# Patient Record
Sex: MALE | Race: WHITE | NOT HISPANIC OR LATINO | Employment: FULL TIME | URBAN - NONMETROPOLITAN AREA
[De-identification: names, ages, dates, MRNs, and addresses within clinical notes are randomized per-mention and may not be internally consistent; named-entity substitution may affect disease eponyms.]

---

## 2018-06-26 ENCOUNTER — TRANSCRIBE ORDERS (OUTPATIENT)
Dept: ADMINISTRATIVE | Facility: HOSPITAL | Age: 33
End: 2018-06-26

## 2018-06-26 DIAGNOSIS — R06.02 SOB (SHORTNESS OF BREATH): Primary | ICD-10-CM

## 2018-07-23 ENCOUNTER — HOSPITAL ENCOUNTER (OUTPATIENT)
Dept: RESPIRATORY THERAPY | Facility: HOSPITAL | Age: 33
Discharge: HOME | End: 2018-07-23
Admitting: PHYSICIAN ASSISTANT

## 2018-07-23 DIAGNOSIS — R06.02 SOB (SHORTNESS OF BREATH): ICD-10-CM

## 2018-07-23 PROCEDURE — 94729 DIFFUSING CAPACITY: CPT | Performed by: INTERNAL MEDICINE

## 2018-07-23 PROCEDURE — 94727 GAS DIL/WSHOT DETER LNG VOL: CPT | Performed by: INTERNAL MEDICINE

## 2018-07-23 PROCEDURE — 94727 GAS DIL/WSHOT DETER LNG VOL: CPT

## 2018-07-23 PROCEDURE — 94060 EVALUATION OF WHEEZING: CPT | Performed by: INTERNAL MEDICINE

## 2018-07-23 PROCEDURE — 94729 DIFFUSING CAPACITY: CPT

## 2018-07-23 PROCEDURE — 94060 EVALUATION OF WHEEZING: CPT

## 2018-07-23 PROCEDURE — 94640 AIRWAY INHALATION TREATMENT: CPT

## 2018-07-23 RX ORDER — ALBUTEROL SULFATE 2.5 MG/3ML
2.5 SOLUTION RESPIRATORY (INHALATION) ONCE
Status: COMPLETED | OUTPATIENT
Start: 2018-07-23 | End: 2018-07-23

## 2018-07-23 RX ADMIN — ALBUTEROL SULFATE 2.5 MG: 2.5 SOLUTION RESPIRATORY (INHALATION) at 09:40

## 2020-09-10 ENCOUNTER — HOSPITAL ENCOUNTER (OUTPATIENT)
Dept: OTHER | Facility: HOSPITAL | Age: 35
Discharge: HOME | End: 2020-09-10
Attending: NURSE PRACTITIONER

## 2020-09-10 ENCOUNTER — OFFICE VISIT CONVERTED (OUTPATIENT)
Dept: FAMILY MEDICINE CLINIC | Age: 35
End: 2020-09-10
Attending: NURSE PRACTITIONER

## 2020-09-10 LAB
ALBUMIN SERPL-MCNC: 4.3 G/DL (ref 3.5–5)
ALBUMIN/GLOB SERPL: 2 {RATIO} (ref 1.4–2.6)
ALP SERPL-CCNC: 68 U/L (ref 53–128)
ALT SERPL-CCNC: 14 U/L (ref 10–40)
ANION GAP SERPL CALC-SCNC: 15 MMOL/L (ref 8–19)
AST SERPL-CCNC: 24 U/L (ref 15–50)
BASOPHILS # BLD MANUAL: 0.02 10*3/UL (ref 0–0.2)
BASOPHILS NFR BLD MANUAL: 0.3 % (ref 0–3)
BILIRUB SERPL-MCNC: 0.56 MG/DL (ref 0.2–1.3)
BUN SERPL-MCNC: 10 MG/DL (ref 5–25)
BUN/CREAT SERPL: 11 {RATIO} (ref 6–20)
CALCIUM SERPL-MCNC: 9 MG/DL (ref 8.7–10.4)
CHLORIDE SERPL-SCNC: 103 MMOL/L (ref 99–111)
CHOLEST SERPL-MCNC: 154 MG/DL (ref 107–200)
CHOLEST/HDLC SERPL: 2.6 {RATIO} (ref 3–6)
CONV CO2: 25 MMOL/L (ref 22–32)
CONV TOTAL PROTEIN: 6.4 G/DL (ref 6.3–8.2)
CREAT UR-MCNC: 0.88 MG/DL (ref 0.7–1.2)
DEPRECATED RDW RBC AUTO: 39.1 FL
EOSINOPHIL # BLD MANUAL: 0.09 10*3/UL (ref 0–0.7)
EOSINOPHIL NFR BLD MANUAL: 1.3 % (ref 0–7)
ERYTHROCYTE [DISTWIDTH] IN BLOOD BY AUTOMATED COUNT: 11.8 % (ref 11.5–14.5)
GFR SERPLBLD BASED ON 1.73 SQ M-ARVRAT: >60 ML/MIN/{1.73_M2}
GLOBULIN UR ELPH-MCNC: 2.1 G/DL (ref 2–3.5)
GLUCOSE SERPL-MCNC: 88 MG/DL (ref 70–99)
GRANS (ABSOLUTE): 5.25 10*3/UL (ref 2–8)
GRANS: 74.5 % (ref 30–85)
HBA1C MFR BLD: 15.9 G/DL (ref 14–18)
HCT VFR BLD AUTO: 44.9 % (ref 42–52)
HDLC SERPL-MCNC: 60 MG/DL (ref 40–60)
IMM GRANULOCYTES # BLD: 0.01 10*3/UL (ref 0–0.54)
IMM GRANULOCYTES NFR BLD: 0.1 % (ref 0–0.43)
LDLC SERPL CALC-MCNC: 54 MG/DL (ref 70–100)
LYMPHOCYTES # BLD MANUAL: 1.08 10*3/UL (ref 1–5)
LYMPHOCYTES NFR BLD MANUAL: 8.5 % (ref 3–10)
MCH RBC QN AUTO: 31.5 PG (ref 27–31)
MCHC RBC AUTO-ENTMCNC: 35.4 G/DL (ref 33–37)
MCV RBC AUTO: 89.1 FL (ref 80–96)
MONOCYTES # BLD AUTO: 0.6 10*3/UL (ref 0.2–1.2)
OSMOLALITY SERPL CALC.SUM OF ELEC: 286 MOSM/KG (ref 273–304)
PLATELET # BLD AUTO: 220 10*3/UL (ref 130–400)
PMV BLD AUTO: 9.2 FL (ref 7.4–10.4)
POTASSIUM SERPL-SCNC: 4.3 MMOL/L (ref 3.5–5.3)
RBC # BLD AUTO: 5.04 10*6/UL (ref 4.7–6.1)
SODIUM SERPL-SCNC: 139 MMOL/L (ref 135–147)
TRIGL SERPL-MCNC: 200 MG/DL (ref 40–150)
TSH SERPL-ACNC: 0.83 M[IU]/L (ref 0.27–4.2)
VARIANT LYMPHS NFR BLD MANUAL: 15.3 % (ref 20–45)
VIT B12 SERPL-MCNC: 297 PG/ML (ref 211–911)
VLDLC SERPL-MCNC: 40 MG/DL (ref 5–37)
WBC # BLD AUTO: 7.05 10*3/UL (ref 4.8–10.8)

## 2020-09-11 LAB — 25(OH)D3 SERPL-MCNC: 48.1 NG/ML (ref 30–100)

## 2021-03-18 ENCOUNTER — BULK ORDERING (OUTPATIENT)
Dept: CASE MANAGEMENT | Facility: OTHER | Age: 36
End: 2021-03-18

## 2021-03-18 DIAGNOSIS — Z23 IMMUNIZATION DUE: ICD-10-CM

## 2021-04-19 ENCOUNTER — HOSPITAL ENCOUNTER (EMERGENCY)
Dept: HOSPITAL 49 - FER | Age: 36
Discharge: HOME | End: 2021-04-19
Payer: COMMERCIAL

## 2021-04-19 DIAGNOSIS — F17.210: ICD-10-CM

## 2021-04-19 DIAGNOSIS — E86.0: Primary | ICD-10-CM

## 2021-04-19 LAB
ALBUMIN SERPL-MCNC: 4.2 G/DL (ref 3.4–5)
ALKALINE PHOSHATASE: 69 U/L (ref 46–116)
ALT SERPL-CCNC: 101 U/L (ref 16–63)
AMORPH URATE CRY #/AREA URNS HPF: (no result) /[HPF]
AMPHETAMINES: NEGATIVE
AST: 93 U/L (ref 15–37)
BARBITURATES: NEGATIVE
BASOPHIL: 0.5 % (ref 0–2)
BILIRUBIN - TOTAL: 0.7 MG/DL (ref 0.2–1)
BILIRUBIN: NEGATIVE MG/DL
BLOOD: NEGATIVE ERY/UL
BUN SERPL-MCNC: 6 MG/DL (ref 7–18)
BUN/CREAT RATIO (CALC): 9.1 RATIO
CHLORIDE: 101 MMOL/L (ref 98–107)
CLARITY UR: CLEAR
CO2 (BICARBONATE): 27 MMOL/L (ref 21–32)
COLOR: YELLOW
CREATININE: 0.66 MG/DL (ref 0.67–1.17)
ECSTASY (MDMA): NEGATIVE
EOSINOPHIL: 0.5 % (ref 0–5)
GLOBULIN (CALCULATION): 2.6 G/DL
GLUCOSE (U): NORMAL MG/DL
GLUCOSE SERPL-MCNC: 90 MG/DL (ref 74–106)
HCT: 48.4 % (ref 42–52)
HGB BLD-MCNC: 17.3 G/DL (ref 13.2–18)
LEUKOCYTES: NEGATIVE LEU/UL
LYMPHOCYTE: 12 % (ref 15–48)
MARIJUANA (THC): NEGATIVE
MCH RBC QN AUTO: 32.8 PG (ref 25–31)
MCHC RBC AUTO-ENTMCNC: 35.7 G/DL (ref 32–36)
MCV: 91.7 FL (ref 78–100)
METHADONE: NEGATIVE
MONOCYTE: 9.4 % (ref 0–12)
MPV: 9.2 FL (ref 6–9.5)
NEUTROPHIL: 77.4 % (ref 41–80)
NITRITE: NEGATIVE MG/DL
NRBC: 0
OPIATES: NEGATIVE
OXYCODONE: NEGATIVE
PLT: 180 K/UL (ref 150–400)
POTASSIUM: 3.8 MMOL/L (ref 3.5–5.1)
PROTEIN: (no result) MG/DL
RBC MORPHOLOGY: NORMAL
RBC: 5.28 M/UL (ref 4.7–6)
RDW: 12.3 % (ref 11.5–14)
SPECIFIC GRAVITY: 1.02 (ref 1–1.03)
TOTAL PROTEIN: 6.8 G/DL (ref 6.4–8.2)
URINARY RBC: (no result)
URINARY WBC: (no result)
UROBILINOGEN: 1 MG/DL (ref 0.2–1)
WBC: 6.2 K/UL (ref 4–10.5)

## 2021-05-18 NOTE — PROGRESS NOTES
Kenneth Wang  1985     Office/Outpatient Visit    Visit Date: Thu, Sep 10, 2020 11:38 am    Provider: Avelina Rogers N.P. (Assistant: Lianna Yang MA)    Location: [facility]        Electronically signed by Avelina Rogers N.P. on  09/10/2020 03:45:48 PM                             Subjective:        CC: Mr. Wang is a 35 year old White male.  Patient presents today for establishment, states he takes more medications but name/dosage unknown         HPI: 35-year-old male presenting to clinic for establishment of care.  Patient states his previous PCP is in [place].  Patient is somewhat of a poor historian.  Patient states he was diagnosed with severe anxiety.  He was started on citalopram by previous PCP at 10 mg.  He states he has been on 30 mg in the past and it worked well.  He is wishing for an increase in dosage. Patient states that he is very anxious and not able to sleep due to anxiety.  He has tried Benadryl and melatonin to help him sleep.  He states he has trouble staying asleep.  He falls asleep easily.  He is also taking Abilify for his symptoms.  He is taking Mirapex for restless legs and it works well. He is unsure of family history.  He is also unsure of when his last labs were drawn.  Medical, surgical, family and social histories reviewed.  Patient does drink daily.    ROS:     CONSTITUTIONAL:  Positive for fatigue ( mild ).   Negative for chills or fever.      CARDIOVASCULAR:  Negative for chest pain, dizziness, palpitations and pedal edema.      RESPIRATORY:  Negative for recent cough and dyspnea.      GASTROINTESTINAL:  Positive for anorexia ( loss of appetite ).   Negative for abdominal pain, constipation, diarrhea, nausea or vomiting.      GENITOURINARY:  Negative for dysuria and change in urine stream.      MUSCULOSKELETAL:  Negative for arthralgias and myalgias.      INTEGUMENTARY:  Negative for rash.      NEUROLOGICAL:  Negative for headaches,  paresthesias and weakness.      PSYCHIATRIC:  Positive for feelings of stress, anhedonia, difficulty concentrating and sadness.   Negative for crying spells or suicidal thoughts.          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   Uses E-cig         Alcohol: Frequency: Daily When he drinks, the average quantity of alcohol is 2 drinks.   He typically consumes beer.          Substance Abuse History:     Last Reviewed on 9/10/2020 03:37 PM by Avelina Rogers    None         Mental Health History:     Last Reviewed on 9/10/2020 03:37 PM by Avelina Rogers        depression         Immunizations:     None        Allergies:     Last Reviewed on 9/10/2020 03:37 PM by Avelina Rogers    No Known Allergies.        Current Medications:     Last Reviewed on 9/10/2020 03:37 PM by Avelina Rogers    Mirapex 1.5 mg oral tablet [take 1 tablet po qd]    LaMICtal 100 mg oral tablet [take 1 tablet (100 mg) by oral route 3 times per day]    ARIPiprazole 5 mg oral tablet [take 1 tablet (5 mg) by oral route once daily]    citalopram 10 mg oral tablet [take 1 tablet (10 mg) by oral route once daily]        Objective:        Vitals:         Current: 9/10/2020 11:43:19 AM    Ht:  5 ft, 6 in;  Wt: 116.6 lbs;  BMI: 18.8T: 97.5 F (temporal);  BP: 117/73 mm Hg (left arm, sitting);  P: 98 bpm (left arm (BP Cuff), sitting)        Exams:     PHYSICAL EXAM:     GENERAL: Vitals recorded well developed, well nourished, thin;  well groomed;  no  apparent distress;     EYES: extraocular movements intact; conjunctiva and cornea are normal; PERRLA;     NECK: range of motion is normal; thyroid exam reveals not enlarged;     RESPIRATORY: normal respiratory rate and pattern with no distress; normal breath sounds with no rales, rhonchi, wheezes or rubs;     CARDIOVASCULAR: normal rate; rhythm is regular;  no systolic murmur; no edema;     LYMPHATIC: no enlargement of cervical or facial nodes; no supraclavicular nodes;     SKIN:  no rash; no jaundice, good turgor;     MUSCULOSKELETAL: normal gait;     NEUROLOGIC: mental status: alert and oriented x 3;     PSYCHIATRIC: affect/demeanor: anxious, depressed;  psychomotor: avoids eye contact;  normal speech pattern; normal thought and perception;         Assessment:         F43.23   Adjustment disorder with mixed anxiety and depressed mood       G47.00   Insomnia, unspecified       Z13.31   Encounter for screening for depression           ORDERS:         Meds Prescribed:       [Refilled] citalopram 10 mg oral tablet [take 1 tablet (10 mg) by oral route once daily], #30 (thirty) tablets, Refills: 1 (one)       [New Rx] citalopram 20 mg oral tablet [take 1 tablet (20 mg) by oral route once daily], #30 (thirty) tablets, Refills: 1 (one)         Lab Orders:       [id number]  PHYSF - [facility] PHYSICAL: CMP, CBC, TSH, LIPID: [id number], [id number], [id number], [id number]  (Send-Out)            05752  VB12 - [facility] Vitamin B12  (Send-Out)            82506  VITD - [facility] Vitamin D, 25 Hydroxy  (Send-Out)              Other Orders:         Depression screen positive and follow up plan documented  (In-House)                      Plan:         Adjustment disorder with mixed anxiety and depressed moodPatient is to take 20 mg tablet along with 10 mg tablet to equal 30 mg of Celexa daily.  Potential side effects of medication discussed.  Hopefully this will help him eat/increased appetite as it did in the past.  Patient to have labs drawn today.  Will notify  patient of results.  Patient is to notify office with any acute concerns or issues.  Patient verbalizes understanding and has no further questions upon discharge.    LABORATORY:  Labs ordered to be performed today include B12, PHYSICAL PANEL; CMP, CBC, TSH, LIPID, and Vitamin D.            Prescriptions:       [Refilled] citalopram 10 mg oral tablet [take 1 tablet (10 mg) by oral route once daily], #30 (thirty) tablets, Refills: 1 (one)       [New Rx] citalopram 20 mg oral tablet [take 1 tablet (20 mg) by oral route once daily], #30 (thirty) tablets, Refills: 1 (one)           Orders:       [id number]  PHYSF - [facility] PHYSICAL: CMP, CBC, TSH, LIPID: [id number], [id number], [id number], [id number]  (Send-Out)            63440  VB12 - [facility] Vitamin B12  (Send-Out)            13636  VITD - [facility] Vitamin D, 25 Hydroxy  (Send-Out)              Insomnia, unspecifiedHopefully Celexa will also help with anxiety at night and patient will be able to sleep.  He is to try over-the-counter Unisom for insomnia as well.  Will discuss at next visit if medication does not help.  Patient verbalizes understanding, agrees plan of care and has no further questions upon discharge.        Encounter for screening for depression    MIPS PHQ-9 Depression Screening: Completed form scanned and in chart; Total Score 17 Positive Depression Screen: Suicide Risk Assessment completed--denies suicidal/homicidal ideation; Pharmacologic intervention initiated/modified           Orders:         Depression screen positive and follow up plan documented  (In-House)                  Charge Capture:         Primary Diagnosis:     F43.23  Adjustment disorder with mixed anxiety and depressed mood           Orders:      [id number]  Office visit - new pt, level 3  (In-House)              G47.00  Insomnia, unspecified     Z13.31  Encounter for screening for depression           Orders:        Depression screen positive and follow up plan documented  (In-House)

## 2021-07-02 VITALS
HEART RATE: 98 BPM | TEMPERATURE: 97.5 F | DIASTOLIC BLOOD PRESSURE: 73 MMHG | HEIGHT: 66 IN | BODY MASS INDEX: 18.74 KG/M2 | SYSTOLIC BLOOD PRESSURE: 117 MMHG | WEIGHT: 116.6 LBS

## 2022-05-02 ENCOUNTER — HOSPITAL ENCOUNTER (EMERGENCY)
Dept: HOSPITAL 49 - FER | Age: 37
Discharge: HOME | End: 2022-05-02
Payer: COMMERCIAL

## 2022-05-02 DIAGNOSIS — Z28.310: ICD-10-CM

## 2022-05-02 DIAGNOSIS — G25.81: ICD-10-CM

## 2022-05-02 DIAGNOSIS — R07.89: Primary | ICD-10-CM

## 2022-05-02 LAB
ALBUMIN SERPL-MCNC: 3.7 G/DL (ref 3.4–5)
ALKALINE PHOSHATASE: 77 U/L (ref 46–116)
ALT SERPL-CCNC: 17 U/L (ref 16–63)
AST: 17 U/L (ref 15–37)
BASOPHIL: 0.4 % (ref 0–2)
BILIRUBIN - TOTAL: 0.2 MG/DL (ref 0.2–1)
BUN SERPL-MCNC: 9 MG/DL (ref 7–18)
BUN/CREAT RATIO (CALC): 14.5 RATIO
CHLORIDE: 103 MMOL/L (ref 98–107)
CO2 (BICARBONATE): 26 MMOL/L (ref 21–32)
CREATININE: 0.62 MG/DL (ref 0.67–1.17)
EOSINOPHIL: 1.1 % (ref 0–5)
GLOBULIN (CALCULATION): 3.1 G/DL
GLUCOSE SERPL-MCNC: 117 MG/DL (ref 74–106)
HCT: 42.6 % (ref 42–52)
HGB BLD-MCNC: 14.8 G/DL (ref 13.2–18)
LYMPHOCYTE: 14.1 % (ref 15–48)
MAGNESIUM SERPL-MCNC: 2.2 MG/DL (ref 1.8–2.4)
MCH RBC QN AUTO: 30.7 PG (ref 25–31)
MCHC RBC AUTO-ENTMCNC: 34.7 G/DL (ref 32–36)
MCV: 88.4 FL (ref 78–100)
MONOCYTE: 5.7 % (ref 0–12)
MPV: 9.5 FL (ref 6–9.5)
NEUTROPHIL: 78.4 % (ref 41–80)
NRBC: 0
PLT: 284 K/UL (ref 150–400)
POTASSIUM: 4.1 MMOL/L (ref 3.5–5.1)
RBC MORPHOLOGY: NORMAL
RBC: 4.82 M/UL (ref 4.7–6)
RDW: 12 % (ref 11.5–14)
TOTAL PROTEIN: 6.8 G/DL (ref 6.4–8.2)
WBC: 9.1 K/UL (ref 4–10.5)

## 2022-05-04 ENCOUNTER — OFFICE VISIT (OUTPATIENT)
Dept: FAMILY MEDICINE CLINIC | Age: 37
End: 2022-05-04

## 2022-05-04 VITALS
WEIGHT: 113 LBS | DIASTOLIC BLOOD PRESSURE: 79 MMHG | HEIGHT: 66 IN | SYSTOLIC BLOOD PRESSURE: 127 MMHG | BODY MASS INDEX: 18.16 KG/M2 | HEART RATE: 90 BPM

## 2022-05-04 DIAGNOSIS — F17.210 CIGARETTE NICOTINE DEPENDENCE WITHOUT COMPLICATION: ICD-10-CM

## 2022-05-04 DIAGNOSIS — G43.009 MIGRAINE WITHOUT AURA AND WITHOUT STATUS MIGRAINOSUS, NOT INTRACTABLE: ICD-10-CM

## 2022-05-04 DIAGNOSIS — G25.81 RESTLESS LEG SYNDROME: ICD-10-CM

## 2022-05-04 DIAGNOSIS — F41.9 ANXIETY: Primary | ICD-10-CM

## 2022-05-04 PROCEDURE — 99214 OFFICE O/P EST MOD 30 MIN: CPT | Performed by: NURSE PRACTITIONER

## 2022-05-04 RX ORDER — RIZATRIPTAN BENZOATE 10 MG/1
10 TABLET ORAL ONCE AS NEEDED
Qty: 18 TABLET | Refills: 1 | Status: SHIPPED | OUTPATIENT
Start: 2022-05-04 | End: 2023-01-09

## 2022-05-04 RX ORDER — PRAMIPEXOLE DIHYDROCHLORIDE 0.5 MG/1
0.5 TABLET ORAL
Qty: 30 TABLET | Refills: 1 | Status: SHIPPED | OUTPATIENT
Start: 2022-05-04 | End: 2022-06-15 | Stop reason: SDUPTHER

## 2022-05-04 RX ORDER — CITALOPRAM 10 MG/1
10 TABLET ORAL DAILY
Qty: 30 TABLET | Refills: 1 | Status: SHIPPED | OUTPATIENT
Start: 2022-05-04 | End: 2022-06-15 | Stop reason: SDUPTHER

## 2022-05-04 RX ORDER — PRAMIPEXOLE DIHYDROCHLORIDE 0.25 MG/1
0.25 TABLET ORAL
COMMUNITY
Start: 2022-05-02 | End: 2022-05-04 | Stop reason: SDUPTHER

## 2022-05-04 NOTE — ASSESSMENT & PLAN NOTE
Patient has been educated on the risk of diseases from using tobacco products such as cancer, COPD, and heart disease and has been advised to quit. I spent less than 3 minutes counseling the patient.

## 2022-05-04 NOTE — PROGRESS NOTES
Chief Complaint  Kenneth Wang presents to [facility] for Establish Care    Subjective          History of Present Illness    Kenneth reports that he is on mirapex for restless leg syndrome. Was previously on 1 mg nightly. Had been off his medication for about 2 years. Was seen at [facility] earlier this week for not sleeping and restarted on mirapex 0.25 mg nightly. Not helping as much as the 1 mg used to.  Was previously seeing mental health provider for anxiety and depression. Also thought possible diagnosis of bipolar disorder as well. Was taking lamictal. Also previously on prozac which was not beneficial. He notes abilify was beneficial. Also no citalopram in the past. Notes mood changes, irritability, feeling overwhelmed, headaches. Was seeing Dr Serrano. Also seeing therapist. No longer drinking lots of alcohol. Only on rare occasion.   He reports migraine headaches. Nausea. Has tried Advil, Tylenol, Excedrin migraine. He gets these frequently.     Review of Systems       No Known Allergies   Past Medical History:   Diagnosis Date   • Anxiety    • Restless leg syndrome      Current Outpatient Medications   Medication Sig Dispense Refill   • pramipexole (MIRAPEX) 0.5 MG tablet Take 1 tablet by mouth every night at bedtime. 30 tablet 1   • citalopram (CeleXA) 10 MG tablet Take 1 tablet by mouth Daily. 30 tablet 1   • rizatriptan (Maxalt) 10 MG tablet Take 1 tablet by mouth 1 (One) Time As Needed for Migraine. May repeat in 2 hours if needed 18 tablet 1     No current facility-administered medications for this visit.     History reviewed. No pertinent surgical history.   Social History     Tobacco Use   • Smoking status: Current Every Day Smoker     Packs/day: 1.00     Types: Cigarettes     Start date: 2000   • Smokeless tobacco: Never Used   Substance Use Topics   • Alcohol use: Yes     Comment: rarely, 1-2 per month   • Drug use: Never     Family History   Problem Relation Age  "of Onset   • Heart disease Paternal Grandmother      Health Maintenance Due   Topic Date Due   • ANNUAL PHYSICAL  Never done      Immunization History   Administered Date(s) Administered   • DTaP 05/16/2018        Objective     Vitals:    05/04/22 1626   BP: 127/79   BP Location: Right arm   Patient Position: Sitting   Pulse: 90   Weight: 51.3 kg (113 lb)   Height: 167.6 cm (66\")     Body mass index is 18.24 kg/m².     Physical Exam  Vitals reviewed.   Constitutional:       General: He is not in acute distress.     Appearance: Normal appearance. He is well-developed.   HENT:      Head: Normocephalic and atraumatic.   Cardiovascular:      Rate and Rhythm: Normal rate and regular rhythm.   Pulmonary:      Effort: Pulmonary effort is normal.      Breath sounds: Normal breath sounds.   Neurological:      Mental Status: He is alert and oriented to person, place, and time.   Psychiatric:         Mood and Affect: Mood and affect normal.         Behavior: Behavior normal.           Result Review :                               Assessment and Plan      Diagnoses and all orders for this visit:    1. Anxiety (Primary)  Assessment & Plan:  Restart daily citalopram. Counseling encouraged. Resources provided.     Orders:  -     citalopram (CeleXA) 10 MG tablet; Take 1 tablet by mouth Daily.  Dispense: 30 tablet; Refill: 1    2. Restless leg syndrome  Comments:  Will increase mirapex dose to 0.5 mg nightly PRN.   Orders:  -     pramipexole (MIRAPEX) 0.5 MG tablet; Take 1 tablet by mouth every night at bedtime.  Dispense: 30 tablet; Refill: 1    3. Migraine without aura and without status migrainosus, not intractable  Assessment & Plan:    Trial maxalt PRN. Headache diary encouraged      Orders:  -     rizatriptan (Maxalt) 10 MG tablet; Take 1 tablet by mouth 1 (One) Time As Needed for Migraine. May repeat in 2 hours if needed  Dispense: 18 tablet; Refill: 1    4. Cigarette nicotine dependence without complication  Assessment & " Plan:    Patient has been educated on the risk of diseases from using tobacco products such as cancer, COPD, and heart disease and has been advised to quit. I spent less than 3 minutes counseling the patient.                  Follow Up     Return in about 6 weeks (around 6/15/2022) for Recheck.

## 2022-06-15 ENCOUNTER — OFFICE VISIT (OUTPATIENT)
Dept: FAMILY MEDICINE CLINIC | Age: 37
End: 2022-06-15

## 2022-06-15 VITALS
HEIGHT: 66 IN | BODY MASS INDEX: 18 KG/M2 | SYSTOLIC BLOOD PRESSURE: 117 MMHG | HEART RATE: 91 BPM | DIASTOLIC BLOOD PRESSURE: 80 MMHG | WEIGHT: 112 LBS

## 2022-06-15 DIAGNOSIS — F41.9 ANXIETY: ICD-10-CM

## 2022-06-15 DIAGNOSIS — G43.009 MIGRAINE WITHOUT AURA AND WITHOUT STATUS MIGRAINOSUS, NOT INTRACTABLE: ICD-10-CM

## 2022-06-15 DIAGNOSIS — G25.81 RESTLESS LEG SYNDROME: Primary | ICD-10-CM

## 2022-06-15 DIAGNOSIS — F17.210 CIGARETTE NICOTINE DEPENDENCE WITHOUT COMPLICATION: ICD-10-CM

## 2022-06-15 PROCEDURE — 99214 OFFICE O/P EST MOD 30 MIN: CPT | Performed by: NURSE PRACTITIONER

## 2022-06-15 RX ORDER — PRAMIPEXOLE DIHYDROCHLORIDE 0.5 MG/1
TABLET ORAL
Qty: 90 TABLET | Refills: 0 | Status: SHIPPED | OUTPATIENT
Start: 2022-06-15 | End: 2022-08-08

## 2022-06-15 RX ORDER — CITALOPRAM 10 MG/1
10 TABLET ORAL DAILY
Qty: 90 TABLET | Refills: 0 | Status: SHIPPED | OUTPATIENT
Start: 2022-06-15 | End: 2022-08-08

## 2022-06-15 RX ORDER — MIRTAZAPINE 7.5 MG/1
7.5 TABLET, FILM COATED ORAL NIGHTLY
Qty: 90 TABLET | Refills: 0 | Status: SHIPPED | OUTPATIENT
Start: 2022-06-15 | End: 2022-08-08

## 2022-06-15 NOTE — PROGRESS NOTES
Chief Complaint  Kenneth Wang presents to [facility] for Anxiety (6 week follow up)    Subjective          History of Present Illness    Kenneth is following up on anxiety and depression. Current medication includes citalopram which was restarted at visit last month. He was previously seeing therapist and mental health provider. He does not feel that the citalopram is helping as much as it did previously. He also notes concern with keeping weight on especially working out in the heat.   Additionally, started on rizatriptan for migraine headaches. Had previously tried Advil, Tylenol, Excedrin migraine. He notes that he has taken on 3 occassions and has helped when he had to take.   He is also taking mirapex at bedtime for RLS. He reports that it helps most nights but some nights feels like he needs more.     Review of Systems      No Known Allergies   Past Medical History:   Diagnosis Date   • Anxiety    • Restless leg syndrome      Current Outpatient Medications   Medication Sig Dispense Refill   • citalopram (CeleXA) 10 MG tablet Take 1 tablet by mouth Daily. 90 tablet 0   • pramipexole (MIRAPEX) 0.5 MG tablet Take 1-2 tablets nightly PRN RLS. 90 tablet 0   • rizatriptan (Maxalt) 10 MG tablet Take 1 tablet by mouth 1 (One) Time As Needed for Migraine. May repeat in 2 hours if needed 18 tablet 1   • mirtazapine (REMERON) 7.5 MG tablet Take 1 tablet by mouth Every Night. 90 tablet 0     No current facility-administered medications for this visit.     History reviewed. No pertinent surgical history.   Social History     Tobacco Use   • Smoking status: Current Every Day Smoker     Packs/day: 1.00     Types: Cigarettes     Start date: 2000   • Smokeless tobacco: Never Used   Substance Use Topics   • Alcohol use: Yes     Comment: rarely, 1-2 per month   • Drug use: Never     Family History   Problem Relation Age of Onset   • Heart disease Paternal Grandmother      Health Maintenance Due  "  Topic Date Due   • ANNUAL PHYSICAL  Never done      Immunization History   Administered Date(s) Administered   • DTaP 05/16/2018        Objective     Vitals:    06/15/22 1705   BP: 117/80   BP Location: Left arm   Patient Position: Sitting   Pulse: 91   Weight: 50.8 kg (112 lb)   Height: 167.6 cm (65.98\")     Body mass index is 18.09 kg/m².     Physical Exam  Vitals reviewed.   Constitutional:       General: He is not in acute distress.     Appearance: Normal appearance. He is well-developed.   HENT:      Head: Normocephalic and atraumatic.   Cardiovascular:      Rate and Rhythm: Normal rate and regular rhythm.   Pulmonary:      Effort: Pulmonary effort is normal.      Breath sounds: Normal breath sounds.   Neurological:      Mental Status: He is alert and oriented to person, place, and time.   Psychiatric:         Mood and Affect: Mood and affect normal.           Result Review :                               Assessment and Plan {CC Problem List  Visit Diagnosis  ROS  Review (Popup)  Health Maintenance  Quality  BestPractice  Medications  SmartSets  SnapShot Encounters  Media :23}     Diagnoses and all orders for this visit:    1. Restless leg syndrome (Primary)  Comments:  Will change mirapex dose to 0.5 mg to 1 mg nightly PRN.   Orders:  -     pramipexole (MIRAPEX) 0.5 MG tablet; Take 1-2 tablets nightly PRN RLS.  Dispense: 90 tablet; Refill: 0    2. Anxiety  Comments:  Add remeron. Continue citalopram. Will let me know of any concerns.  Orders:  -     citalopram (CeleXA) 10 MG tablet; Take 1 tablet by mouth Daily.  Dispense: 90 tablet; Refill: 0  -     mirtazapine (REMERON) 7.5 MG tablet; Take 1 tablet by mouth Every Night.  Dispense: 90 tablet; Refill: 0    3. Migraine without aura and without status migrainosus, not intractable  Comments:  May continue rizatriptan as needed. No refill needed at this time    4. Cigarette nicotine dependence without complication  Assessment & Plan:  Patient has " been educated on the risk of diseases from using tobacco products such as cancer, COPD, and heart disease and has been advised to quit. I spent less than 3 minutes counseling the patient.                  Follow Up     Return in about 8 weeks (around 8/10/2022) for Recheck.

## 2022-07-25 ENCOUNTER — HOSPITAL ENCOUNTER (OUTPATIENT)
Dept: GENERAL RADIOLOGY | Facility: HOSPITAL | Age: 37
Discharge: HOME | End: 2022-07-25
Admitting: PHYSICIAN ASSISTANT

## 2022-07-25 ENCOUNTER — OFFICE VISIT (OUTPATIENT)
Dept: FAMILY MEDICINE CLINIC | Age: 37
End: 2022-07-25

## 2022-07-25 VITALS
WEIGHT: 108 LBS | HEIGHT: 66 IN | TEMPERATURE: 97.9 F | BODY MASS INDEX: 17.36 KG/M2 | HEART RATE: 102 BPM | DIASTOLIC BLOOD PRESSURE: 77 MMHG | SYSTOLIC BLOOD PRESSURE: 123 MMHG

## 2022-07-25 DIAGNOSIS — J18.9 PNEUMONIA OF BOTH LUNGS DUE TO INFECTIOUS ORGANISM, UNSPECIFIED PART OF LUNG: Primary | ICD-10-CM

## 2022-07-25 DIAGNOSIS — R07.89 CHEST WALL PAIN: ICD-10-CM

## 2022-07-25 PROCEDURE — 71046 X-RAY EXAM CHEST 2 VIEWS: CPT

## 2022-07-25 PROCEDURE — 99213 OFFICE O/P EST LOW 20 MIN: CPT | Performed by: PHYSICIAN ASSISTANT

## 2022-07-25 RX ORDER — MELOXICAM 7.5 MG/1
7.5 TABLET ORAL AS NEEDED
COMMUNITY
Start: 2022-07-22 | End: 2022-07-25 | Stop reason: ALTCHOICE

## 2022-07-25 RX ORDER — DICLOFENAC SODIUM 75 MG/1
75 TABLET, DELAYED RELEASE ORAL 2 TIMES DAILY
Qty: 14 TABLET | Refills: 0 | Status: SHIPPED | OUTPATIENT
Start: 2022-07-25 | End: 2022-08-01

## 2022-07-25 RX ORDER — AMOXICILLIN AND CLAVULANATE POTASSIUM 875; 125 MG/1; MG/1
1 TABLET, FILM COATED ORAL 2 TIMES DAILY
Qty: 14 TABLET | Refills: 0 | Status: SHIPPED | OUTPATIENT
Start: 2022-07-25 | End: 2022-08-01

## 2022-07-25 RX ORDER — AZITHROMYCIN 250 MG/1
TABLET, FILM COATED ORAL
Qty: 6 TABLET | Refills: 0 | Status: SHIPPED | OUTPATIENT
Start: 2022-07-25 | End: 2022-08-08

## 2022-07-25 NOTE — PROGRESS NOTES
"Subjective     CHIEF COMPLAINT    Chief Complaint   Patient presents with   • Inflamed Lungs     Urgent Care follow up            This is a 37-year-old male presenting to the clinic for \"chest wall inflammation.\"  He was seen at an outside clinic 3 days ago for some right sided lateral chest pain.  He states he has had some cough/congestion lately prior to onset of the symptoms.  He reports that they did no testing or x-rays and diagnosed him with the some \"lung inflammation\" but that it was \"outside of his lungs.\"  He has been taking 7.5 mg of meloxicam with minimal improvement.              Review of Systems   Constitutional: Negative for chills, fatigue and fever.   HENT: Positive for congestion. Negative for rhinorrhea and sore throat.    Respiratory: Positive for cough. Negative for shortness of breath and wheezing.    Cardiovascular: Positive for chest pain.   Gastrointestinal: Negative for abdominal pain, diarrhea, nausea and vomiting.   Musculoskeletal: Negative for myalgias.   Skin: Negative for rash.   Neurological: Negative for headaches.            Past Medical History:   Diagnosis Date   • Anxiety    • Restless leg syndrome             History reviewed. No pertinent surgical history.         Family History   Problem Relation Age of Onset   • Heart disease Paternal Grandmother             Social History     Socioeconomic History   • Marital status:    Tobacco Use   • Smoking status: Current Every Day Smoker     Packs/day: 1.00     Types: Cigarettes     Start date: 2000   • Smokeless tobacco: Never Used   Substance and Sexual Activity   • Alcohol use: Yes     Comment: rarely, 1-2 per month   • Drug use: Never   • Sexual activity: Defer            No Known Allergies         Current Outpatient Medications on File Prior to Visit   Medication Sig Dispense Refill   • citalopram (CeleXA) 10 MG tablet Take 1 tablet by mouth Daily. 90 tablet 0   • mirtazapine (REMERON) 7.5 MG tablet Take 1 tablet by mouth " "Every Night. 90 tablet 0   • pramipexole (MIRAPEX) 0.5 MG tablet Take 1-2 tablets nightly PRN RLS. 90 tablet 0   • rizatriptan (Maxalt) 10 MG tablet Take 1 tablet by mouth 1 (One) Time As Needed for Migraine. May repeat in 2 hours if needed 18 tablet 1   • [DISCONTINUED] meloxicam (MOBIC) 7.5 MG tablet Take 7.5 mg by mouth As Needed.       No current facility-administered medications on file prior to visit.            /77 (BP Location: Left arm, Patient Position: Sitting)   Pulse 102   Temp 97.9 °F (36.6 °C) (Oral)   Ht 167.6 cm (65.98\")   Wt 49 kg (108 lb)   BMI 17.44 kg/m²          Objective     Physical Exam  Vitals and nursing note reviewed.   Constitutional:       General: He is not in acute distress.     Appearance: Normal appearance.   HENT:      Head: Normocephalic and atraumatic.   Cardiovascular:      Rate and Rhythm: Normal rate and regular rhythm.      Heart sounds: Normal heart sounds.   Pulmonary:      Effort: Pulmonary effort is normal. No respiratory distress.      Breath sounds: Normal breath sounds. No wheezing, rhonchi or rales.   Chest:      Chest wall: Tenderness (right anterior axillary line, around 4th rib) present.   Skin:     General: Skin is warm and dry.   Neurological:      Mental Status: He is alert and oriented to person, place, and time.   Psychiatric:         Mood and Affect: Mood normal.         Behavior: Behavior normal.              Procedures                    Lab Results (last 24 hours)     ** No results found for the last 24 hours. **                XR Chest PA & Lateral    Result Date: 7/25/2022  PROCEDURE: XR CHEST PA AND LATERAL  COMPARISON: None  INDICATIONS: COUGH, CONGESTION X 2 MONTHS, RIGHT LATERAL RIB/CHEST PAIN X 2 DAYS  FINDINGS:  There is pulmonary hyperinflation suggesting emphysematous change.  There are patchy airspace opacities in the mid lungs bilaterally suggesting pneumonia, right greater than left.  This has a somewhat nodular character on the " right.  Recommend follow-up evaluation to document resolution.  If this persists on follow-up, then chest CT recommended given the patient's reported smoking history.  There is mild degenerative change in the spine.  No pleural fluid is seen.  No pneumothorax is evident.         There are patchy airspace opacities in the mid lungs bilaterally suggesting pneumonia.  This has a somewhat nodular character on the right.  Recommend follow-up evaluation to document resolution.  Please see above.  Pulmonary hyperinflation suggesting emphysema.     GEO PARISH MD       Electronically Signed and Approved By: GEO PARISH MD on 7/25/2022 at 14:48                               Diagnoses and all orders for this visit:    1. Pneumonia of both lungs due to infectious organism, unspecified part of lung (Primary)  -     amoxicillin-clavulanate (AUGMENTIN) 875-125 MG per tablet; Take 1 tablet by mouth 2 (Two) Times a Day for 7 days.  Dispense: 14 tablet; Refill: 0  -     azithromycin (Zithromax Z-Nando) 250 MG tablet; Take 2 tablets by mouth on day 1, then 1 tablet daily on days 2-5  Dispense: 6 tablet; Refill: 0    2. Chest wall pain  -     XR Chest PA & Lateral; Future  -     diclofenac (VOLTAREN) 75 MG EC tablet; Take 1 tablet by mouth 2 (Two) Times a Day for 7 days.  Dispense: 14 tablet; Refill: 0                           FOR FULL DISCHARGE INSTRUCTIONS/COMMENTS/HANDOUTS please see the AVS

## 2022-08-08 ENCOUNTER — OFFICE VISIT (OUTPATIENT)
Dept: FAMILY MEDICINE CLINIC | Age: 37
End: 2022-08-08

## 2022-08-08 VITALS
HEART RATE: 82 BPM | HEIGHT: 66 IN | DIASTOLIC BLOOD PRESSURE: 65 MMHG | WEIGHT: 109 LBS | BODY MASS INDEX: 17.52 KG/M2 | SYSTOLIC BLOOD PRESSURE: 107 MMHG | TEMPERATURE: 98.6 F

## 2022-08-08 DIAGNOSIS — J18.9 PNEUMONIA OF BOTH LUNGS DUE TO INFECTIOUS ORGANISM, UNSPECIFIED PART OF LUNG: ICD-10-CM

## 2022-08-08 DIAGNOSIS — F41.9 ANXIETY: Primary | ICD-10-CM

## 2022-08-08 DIAGNOSIS — F17.210 CIGARETTE NICOTINE DEPENDENCE WITHOUT COMPLICATION: ICD-10-CM

## 2022-08-08 DIAGNOSIS — R09.1 PLEURISY: ICD-10-CM

## 2022-08-08 DIAGNOSIS — G43.009 MIGRAINE WITHOUT AURA AND WITHOUT STATUS MIGRAINOSUS, NOT INTRACTABLE: ICD-10-CM

## 2022-08-08 DIAGNOSIS — G25.81 RESTLESS LEG SYNDROME: ICD-10-CM

## 2022-08-08 DIAGNOSIS — J43.9 PULMONARY EMPHYSEMA, UNSPECIFIED EMPHYSEMA TYPE: ICD-10-CM

## 2022-08-08 PROCEDURE — 99214 OFFICE O/P EST MOD 30 MIN: CPT | Performed by: NURSE PRACTITIONER

## 2022-08-08 RX ORDER — CITALOPRAM 20 MG/1
20 TABLET ORAL DAILY
Qty: 90 TABLET | Refills: 0 | Status: SHIPPED | OUTPATIENT
Start: 2022-08-08 | End: 2022-09-02

## 2022-08-08 RX ORDER — PREDNISONE 10 MG/1
TABLET ORAL
Qty: 35 TABLET | Refills: 0 | Status: SHIPPED | OUTPATIENT
Start: 2022-08-08 | End: 2022-08-23

## 2022-08-08 RX ORDER — PRAMIPEXOLE DIHYDROCHLORIDE 1 MG/1
TABLET ORAL
Qty: 90 TABLET | Refills: 0 | Status: SHIPPED | OUTPATIENT
Start: 2022-08-08 | End: 2022-10-20

## 2022-08-08 RX ORDER — ALBUTEROL SULFATE 90 UG/1
2 AEROSOL, METERED RESPIRATORY (INHALATION) EVERY 4 HOURS PRN
Qty: 18 G | Refills: 2 | Status: SHIPPED | OUTPATIENT
Start: 2022-08-08

## 2022-08-08 NOTE — PROGRESS NOTES
Chief Complaint  Kenneth Wang presents to [facility] for Anxiety (8 week follow up/Lungs still hurting)    Subjective          History of Present Illness    Kenneth is following up on anxiety and depression. Current medication includes citalopram and remeron which was started at last visit.  He feels that the remeron has made him more depressed. His fiance has noticed a difference. He has no desire to do anything. He did not have any negative effects from the citalopram.  Additionally, he is on rizatriptan for migraine headaches. Had previously tried Advil, Tylenol, Excedrin migraine.   He is also taking mirapex at bedtime for RLS. Notes improvement on current dose. He is taking 2 of the 0.5 mg tablets nightly.  Treated for PNA with Augmentin and Zithromax on 7/25/22. He notes symptoms started with cough and congestion. He is havibng more chest discomfort now. He is coughing up some phlegm which is clear. He notes chest discomfort on the right side.  Chest x-ray on 7/25/2022 showed patchy airspace opacities in the mid lungs bilaterally suggesting pneumonia.  This has a somewhat nodular character on the right.  Follow-up evaluation to document resolution was recommended.  Additionally, showed pulmonary hyperinflation suggesting emphysema.      Review of Systems      No Known Allergies   Past Medical History:   Diagnosis Date   • Anxiety    • Restless leg syndrome      Current Outpatient Medications   Medication Sig Dispense Refill   • citalopram (CeleXA) 20 MG tablet Take 1 tablet by mouth Daily. 90 tablet 0   • pramipexole (MIRAPEX) 1 MG tablet Take 1 tablet nightly PRN RLS. 90 tablet 0   • rizatriptan (Maxalt) 10 MG tablet Take 1 tablet by mouth 1 (One) Time As Needed for Migraine. May repeat in 2 hours if needed 18 tablet 1   • albuterol sulfate  (90 Base) MCG/ACT inhaler Inhale 2 puffs Every 4 (Four) Hours As Needed for Shortness of Air. 18 g 2   • predniSONE (DELTASONE) 10  "MG tablet Take 2 tablets by mouth 2 (Two) Times a Day for 5 days, THEN 1 tablet 2 (Two) Times a Day for 5 days, THEN 1 tablet Daily for 5 days. 35 tablet 0     No current facility-administered medications for this visit.     History reviewed. No pertinent surgical history.   Social History     Tobacco Use   • Smoking status: Current Every Day Smoker     Packs/day: 1.00     Types: Cigarettes     Start date: 2000   • Smokeless tobacco: Never Used   Substance Use Topics   • Alcohol use: Yes     Comment: rarely, 1-2 per month   • Drug use: Never     Family History   Problem Relation Age of Onset   • Heart disease Paternal Grandmother      Health Maintenance Due   Topic Date Due   • ANNUAL PHYSICAL  Never done      Immunization History   Administered Date(s) Administered   • DTaP 05/16/2018        Objective     Vitals:    08/08/22 1539   BP: 107/65   BP Location: Left arm   Patient Position: Sitting   Pulse: 82   Temp: 98.6 °F (37 °C)   TempSrc: Oral   Weight: 49.4 kg (109 lb)   Height: 167.6 cm (65.98\")     Body mass index is 17.6 kg/m².     Physical Exam  Vitals reviewed.   Constitutional:       General: He is not in acute distress.     Appearance: Normal appearance. He is well-developed.   HENT:      Head: Normocephalic and atraumatic.   Cardiovascular:      Rate and Rhythm: Normal rate and regular rhythm.   Pulmonary:      Effort: Pulmonary effort is normal.      Breath sounds: Normal breath sounds.   Chest:      Chest wall: Tenderness (right side) present.   Neurological:      Mental Status: He is alert and oriented to person, place, and time.   Psychiatric:         Mood and Affect: Mood and affect normal.           Result Review :     The following data was reviewed by: MICHAEL Matta on 08/08/2022:               XR Chest PA & Lateral (07/25/2022 14:18)             Assessment and Plan      Diagnoses and all orders for this visit:    1. Anxiety (Primary)  Comments:  Stop remeron. Will increase citalopram dose " to 20 mg daily.   Orders:  -     citalopram (CeleXA) 20 MG tablet; Take 1 tablet by mouth Daily.  Dispense: 90 tablet; Refill: 0    2. Migraine without aura and without status migrainosus, not intractable  Comments:  Medical condition is stable.  Continue same therapy.  Will recheck at next regular appointment.    3. Restless leg syndrome  Comments:  Stable on current medication.  Orders:  -     pramipexole (MIRAPEX) 1 MG tablet; Take 1 tablet nightly PRN RLS.  Dispense: 90 tablet; Refill: 0    4. Cigarette nicotine dependence without complication  Assessment & Plan:  Patient has been educated on the risk of diseases from using tobacco products such as cancer, COPD, and heart disease and has been advised to quit. I spent less than 3 minutes counseling the patient.          5. Pleurisy  -     predniSONE (DELTASONE) 10 MG tablet; Take 2 tablets by mouth 2 (Two) Times a Day for 5 days, THEN 1 tablet 2 (Two) Times a Day for 5 days, THEN 1 tablet Daily for 5 days.  Dispense: 35 tablet; Refill: 0    6. Pulmonary emphysema, unspecified emphysema type (HCC)  -     albuterol sulfate  (90 Base) MCG/ACT inhaler; Inhale 2 puffs Every 4 (Four) Hours As Needed for Shortness of Air.  Dispense: 18 g; Refill: 2    7. Pneumonia of both lungs due to infectious organism, unspecified part of lung  Comments:  Will repeat chest xray one month after previous.   Orders:  -     XR Chest PA & Lateral; Future            Follow Up     Return in about 3 months (around 11/8/2022) for Recheck.

## 2022-08-31 ENCOUNTER — OFFICE VISIT (OUTPATIENT)
Dept: FAMILY MEDICINE CLINIC | Age: 37
End: 2022-08-31

## 2022-08-31 VITALS
HEIGHT: 66 IN | HEART RATE: 84 BPM | DIASTOLIC BLOOD PRESSURE: 70 MMHG | WEIGHT: 106 LBS | BODY MASS INDEX: 17.04 KG/M2 | OXYGEN SATURATION: 98 % | SYSTOLIC BLOOD PRESSURE: 121 MMHG

## 2022-08-31 DIAGNOSIS — J02.9 SORE THROAT: Primary | ICD-10-CM

## 2022-08-31 DIAGNOSIS — H65.93 FLUID LEVEL BEHIND TYMPANIC MEMBRANE OF BOTH EARS: ICD-10-CM

## 2022-08-31 DIAGNOSIS — Z72.0 TOBACCO ABUSE: ICD-10-CM

## 2022-08-31 DIAGNOSIS — Z91.09 ENVIRONMENTAL ALLERGIES: ICD-10-CM

## 2022-08-31 LAB
EXPIRATION DATE: NORMAL
INTERNAL CONTROL: NORMAL
Lab: NORMAL
S PYO AG THROAT QL: NEGATIVE

## 2022-08-31 PROCEDURE — 87081 CULTURE SCREEN ONLY: CPT | Performed by: NURSE PRACTITIONER

## 2022-08-31 PROCEDURE — 87880 STREP A ASSAY W/OPTIC: CPT | Performed by: NURSE PRACTITIONER

## 2022-08-31 PROCEDURE — 99213 OFFICE O/P EST LOW 20 MIN: CPT | Performed by: NURSE PRACTITIONER

## 2022-08-31 RX ORDER — NICOTINE 21-14-7MG
1 KIT TRANSDERMAL DAILY
Qty: 56 EACH | Refills: 0 | Status: SHIPPED | OUTPATIENT
Start: 2022-08-31 | End: 2022-09-07

## 2022-08-31 RX ORDER — FLUTICASONE PROPIONATE 50 MCG
2 SPRAY, SUSPENSION (ML) NASAL DAILY
Qty: 11.1 ML | Refills: 0 | Status: SHIPPED | OUTPATIENT
Start: 2022-08-31

## 2022-08-31 RX ORDER — CETIRIZINE HYDROCHLORIDE 10 MG/1
10 TABLET ORAL DAILY
Qty: 90 TABLET | Refills: 3 | Status: SHIPPED | OUTPATIENT
Start: 2022-08-31

## 2022-08-31 NOTE — PROGRESS NOTES
"Chief Complaint  Sore Throat    Subjective        Kenneth Wang presents to [facility]  Sore Throat   This is a new problem. The current episode started 1 to 4 weeks ago. The problem has been gradually worsening. There has been no fever. Associated symptoms include headaches, shortness of breath and trouble swallowing. Pertinent negatives include no congestion, coughing or ear pain. He has had no exposure to strep.       Objective   Vital Signs:  /70   Pulse 84   Ht 167.6 cm (65.98\")   Wt 48.1 kg (106 lb)   SpO2 98%   BMI 17.12 kg/m²   Estimated body mass index is 17.12 kg/m² as calculated from the following:    Height as of this encounter: 167.6 cm (65.98\").    Weight as of this encounter: 48.1 kg (106 lb).          Physical Exam  HENT:      Head: Normocephalic.      Right Ear: A middle ear effusion is present.      Left Ear: A middle ear effusion is present.      Mouth/Throat:      Pharynx: Posterior oropharyngeal erythema present. No oropharyngeal exudate.   Cardiovascular:      Rate and Rhythm: Normal rate and regular rhythm.   Pulmonary:      Effort: Pulmonary effort is normal. No respiratory distress.      Breath sounds: Normal breath sounds. No stridor. No wheezing, rhonchi or rales.   Skin:     General: Skin is warm and dry.   Neurological:      Mental Status: He is alert and oriented to person, place, and time.   Psychiatric:         Mood and Affect: Mood normal.        Result Review :                 Results for orders placed or performed in visit on 08/31/22   POCT rapid strep A    Specimen: Swab   Result Value Ref Range    Rapid Strep A Screen Negative Negative, VALID, INVALID, Not Performed    Internal Control Passed Passed    Lot Number [id number]     Expiration Date 11/30/23        Assessment and Plan   Diagnoses and all orders for this visit:    1. Sore throat (Primary)  -     POCT rapid strep A  -     Beta Strep Culture, Throat - Swab, Throat; Future  -     " Beta Strep Culture, Throat - Swab, Throat    2. Environmental allergies  -     cetirizine (zyrTEC) 10 MG tablet; Take 1 tablet by mouth Daily.  Dispense: 90 tablet; Refill: 3  -     fluticasone (Flonase) 50 MCG/ACT nasal spray; 2 sprays into the nostril(s) as directed by provider Daily.  Dispense: 11.1 mL; Refill: 0    3. Fluid level behind tympanic membrane of both ears  -     fluticasone (Flonase) 50 MCG/ACT nasal spray; 2 sprays into the nostril(s) as directed by provider Daily.  Dispense: 11.1 mL; Refill: 0    4. Tobacco abuse  -     Nicotine 21-14-7 MG/24HR kit; Place 1 each on the skin as directed by provider Daily.  Dispense: 56 each; Refill: 0             Follow Up   Return if symptoms worsen or fail to improve.  Patient was given instructions and counseling regarding his condition or for health maintenance advice. Please see specific information pulled into the AVS if appropriate.

## 2022-09-01 ENCOUNTER — TELEPHONE (OUTPATIENT)
Dept: FAMILY MEDICINE CLINIC | Age: 37
End: 2022-09-01

## 2022-09-02 ENCOUNTER — HOSPITAL ENCOUNTER (OUTPATIENT)
Dept: GENERAL RADIOLOGY | Facility: HOSPITAL | Age: 37
Discharge: HOME | End: 2022-09-02
Admitting: NURSE PRACTITIONER

## 2022-09-02 ENCOUNTER — OFFICE VISIT (OUTPATIENT)
Dept: FAMILY MEDICINE CLINIC | Age: 37
End: 2022-09-02

## 2022-09-02 VITALS
DIASTOLIC BLOOD PRESSURE: 72 MMHG | HEIGHT: 66 IN | HEART RATE: 84 BPM | WEIGHT: 105 LBS | BODY MASS INDEX: 16.88 KG/M2 | TEMPERATURE: 98.6 F | SYSTOLIC BLOOD PRESSURE: 112 MMHG

## 2022-09-02 DIAGNOSIS — J18.9 PNEUMONIA OF BOTH LUNGS DUE TO INFECTIOUS ORGANISM, UNSPECIFIED PART OF LUNG: ICD-10-CM

## 2022-09-02 DIAGNOSIS — J18.9 PNEUMONIA DUE TO INFECTIOUS ORGANISM, UNSPECIFIED LATERALITY, UNSPECIFIED PART OF LUNG: ICD-10-CM

## 2022-09-02 DIAGNOSIS — F17.210 CIGARETTE NICOTINE DEPENDENCE WITHOUT COMPLICATION: ICD-10-CM

## 2022-09-02 DIAGNOSIS — J02.9 PHARYNGITIS, UNSPECIFIED ETIOLOGY: Primary | ICD-10-CM

## 2022-09-02 DIAGNOSIS — F41.9 ANXIETY: ICD-10-CM

## 2022-09-02 LAB — BACTERIA SPEC AEROBE CULT: NORMAL

## 2022-09-02 PROCEDURE — 99214 OFFICE O/P EST MOD 30 MIN: CPT | Performed by: NURSE PRACTITIONER

## 2022-09-02 PROCEDURE — 71046 X-RAY EXAM CHEST 2 VIEWS: CPT

## 2022-09-02 RX ORDER — CEFDINIR 300 MG/1
300 CAPSULE ORAL 2 TIMES DAILY
Qty: 20 CAPSULE | Refills: 0 | Status: SHIPPED | OUTPATIENT
Start: 2022-09-02 | End: 2022-09-12

## 2022-09-02 RX ORDER — ESCITALOPRAM OXALATE 10 MG/1
10 TABLET ORAL DAILY
Qty: 30 TABLET | Refills: 1 | Status: SHIPPED | OUTPATIENT
Start: 2022-09-02 | End: 2022-10-12

## 2022-09-02 NOTE — PROGRESS NOTES
Chief Complaint  Kenneth Wang presents to [facility] for Sore Throat (Follow up for sore throat/No better, X 3 weeks )    Subjective          History of Present Illness    Kenneth is here today with c/o 2 week history of symptoms including sore throat. Feels like bumps on back of tongue. He has been taking Cetirizine and fluticasone without much improvement.   He was treated for PNA at end of July with Zithromax. Due for repeat chest xray.   He has noted increased depression since increasing his citalopram dose. He was having more anxiety symptoms prior to that. He has been on prozac, wellbutrin, remeron in the past which he either did not tolerate or were ineffective.     Review of Systems      No Known Allergies   Past Medical History:   Diagnosis Date   • Anxiety    • Restless leg syndrome      Current Outpatient Medications   Medication Sig Dispense Refill   • albuterol sulfate  (90 Base) MCG/ACT inhaler Inhale 2 puffs Every 4 (Four) Hours As Needed for Shortness of Air. 18 g 2   • cetirizine (zyrTEC) 10 MG tablet Take 1 tablet by mouth Daily. 90 tablet 3   • fluticasone (Flonase) 50 MCG/ACT nasal spray 2 sprays into the nostril(s) as directed by provider Daily. 11.1 mL 0   • Nicotine 21-14-7 MG/24HR kit Place 1 each on the skin as directed by provider Daily. 56 each 0   • pramipexole (MIRAPEX) 1 MG tablet Take 1 tablet nightly PRN RLS. 90 tablet 0   • rizatriptan (Maxalt) 10 MG tablet Take 1 tablet by mouth 1 (One) Time As Needed for Migraine. May repeat in 2 hours if needed 18 tablet 1   • cefdinir (OMNICEF) 300 MG capsule Take 1 capsule by mouth 2 (Two) Times a Day for 10 days. 20 capsule 0   • escitalopram (LEXAPRO) 10 MG tablet Take 1 tablet by mouth Daily for 60 days. 30 tablet 1     No current facility-administered medications for this visit.     History reviewed. No pertinent surgical history.   Social History     Tobacco Use   • Smoking status: Current Every Day  "Smoker     Packs/day: 1.00     Types: Cigarettes     Start date: 2000   • Smokeless tobacco: Never Used   Substance Use Topics   • Alcohol use: Yes     Comment: rarely, 1-2 per month   • Drug use: Never     Family History   Problem Relation Age of Onset   • Heart disease Paternal Grandmother      Health Maintenance Due   Topic Date Due   • ANNUAL PHYSICAL  Never done      Immunization History   Administered Date(s) Administered   • DTaP 05/16/2018        Objective     Vitals:    09/02/22 1537   BP: 112/72   BP Location: Right arm   Patient Position: Sitting   Pulse: 84   Temp: 98.6 °F (37 °C)   TempSrc: Oral   Weight: 47.6 kg (105 lb)   Height: 167.6 cm (65.98\")     Body mass index is 16.96 kg/m².     Physical Exam  Vitals reviewed.   Constitutional:       General: He is not in acute distress.     Appearance: Normal appearance. He is well-developed.   HENT:      Head: Normocephalic and atraumatic.      Right Ear: Tympanic membrane and ear canal normal.      Left Ear: Tympanic membrane and ear canal normal.      Nose: Nose normal.      Mouth/Throat:      Mouth: Mucous membranes are moist.   Eyes:      Extraocular Movements: Extraocular movements intact.      Pupils: Pupils are equal, round, and reactive to light.   Cardiovascular:      Rate and Rhythm: Normal rate and regular rhythm.   Pulmonary:      Effort: Pulmonary effort is normal.      Breath sounds: Normal breath sounds.   Neurological:      Mental Status: He is alert and oriented to person, place, and time.   Psychiatric:         Mood and Affect: Mood and affect normal.           Result Review :     The following data was reviewed by: MICHAEL Matta on 09/02/2022:          Beta Strep Culture, Throat - Swab, Throat (08/31/2022 12:28)  POCT rapid strep A (08/31/2022 12:27)                  Assessment and Plan      Diagnoses and all orders for this visit:    1. Pharyngitis, unspecified etiology (Primary)  Comments:  Will treat with oral antibiotic course. " If no improvement, may need to see ENT  Orders:  -     cefdinir (OMNICEF) 300 MG capsule; Take 1 capsule by mouth 2 (Two) Times a Day for 10 days.  Dispense: 20 capsule; Refill: 0    2. Anxiety  Comments:  Stop citalopram. Start Lexapro.   Orders:  -     escitalopram (LEXAPRO) 10 MG tablet; Take 1 tablet by mouth Daily for 60 days.  Dispense: 30 tablet; Refill: 1    3. Cigarette nicotine dependence without complication  Assessment & Plan:  Patient has been educated on the risk of diseases from using tobacco products such as cancer, COPD, and heart disease and has been advised to quit. I spent less than 3 minutes counseling the patient.          4. Pneumonia due to infectious organism, unspecified laterality, unspecified part of lung  Comments:  Will repeat chest xray to follow up on PNA            Follow Up     Return for Next scheduled follow up.

## 2022-09-07 ENCOUNTER — TELEPHONE (OUTPATIENT)
Dept: FAMILY MEDICINE CLINIC | Age: 37
End: 2022-09-07

## 2022-09-07 DIAGNOSIS — F17.210 CIGARETTE NICOTINE DEPENDENCE WITHOUT COMPLICATION: Primary | ICD-10-CM

## 2022-09-07 RX ORDER — NICOTINE 21 MG/24HR
1 PATCH, TRANSDERMAL 24 HOURS TRANSDERMAL EVERY 24 HOURS
Qty: 30 PATCH | Refills: 1 | Status: SHIPPED | OUTPATIENT
Start: 2022-09-07 | End: 2023-01-09

## 2022-09-07 NOTE — TELEPHONE ENCOUNTER
Caller: Kenneth Wang    Relationship: Self    Best call back number:[phone]     What medication are you requesting: NICOTINE PATCHES    What are your current symptoms:     How long have you been experiencing symptoms:     Have you had these symptoms before:    [] Yes  [] No    Have you been treated for these symptoms before:   [] Yes  [] No    If a prescription is needed, what is your preferred pharmacy and phone number: [facility] [address] [place] [address] [id number] [facility] [phone] [facility] [phone]      Additional notes: PATIENT STATES PHARMACIST SAYS YOU NEED TO SEND PRESCRIPTION FOR THE PATCHES, NOT THE KIT

## 2022-09-07 NOTE — TELEPHONE ENCOUNTER
The Kit prescription was previously sent by Dawn Rodriguez. I have sent another prescription for 21 mg patch. He can call if ready to go down to 14 mg or 7 mg and I can send in prescription for that

## 2022-10-12 ENCOUNTER — OFFICE VISIT (OUTPATIENT)
Dept: FAMILY MEDICINE CLINIC | Age: 37
End: 2022-10-12

## 2022-10-12 VITALS
DIASTOLIC BLOOD PRESSURE: 74 MMHG | HEIGHT: 66 IN | WEIGHT: 105 LBS | BODY MASS INDEX: 16.88 KG/M2 | TEMPERATURE: 99.5 F | HEART RATE: 94 BPM | SYSTOLIC BLOOD PRESSURE: 111 MMHG

## 2022-10-12 DIAGNOSIS — G25.81 RESTLESS LEG SYNDROME: ICD-10-CM

## 2022-10-12 DIAGNOSIS — F41.9 ANXIETY: Primary | ICD-10-CM

## 2022-10-12 PROCEDURE — 99214 OFFICE O/P EST MOD 30 MIN: CPT | Performed by: NURSE PRACTITIONER

## 2022-10-12 RX ORDER — DOXEPIN HYDROCHLORIDE 25 MG/1
25 CAPSULE ORAL NIGHTLY
Qty: 90 CAPSULE | Refills: 0 | Status: SHIPPED | OUTPATIENT
Start: 2022-10-12 | End: 2022-11-18 | Stop reason: SDUPTHER

## 2022-10-12 RX ORDER — IBUPROFEN 600 MG/1
600 TABLET ORAL SEE ADMIN INSTRUCTIONS
COMMUNITY
Start: 2022-09-20

## 2022-10-12 NOTE — PROGRESS NOTES
Chief Complaint  Kenneth Wang presents to [facility] for Anxiety (Wants to talk about anxiety med, has not been working )    Subjective          History of Present Illness    Kenneth is here today to follow up on anxiety. Current prescribed medication includes lexapro 10 mg daily. This was started 9/2/22. He was on citalopram prior but had reported some depression symptoms. He has been on prozac, wellbutrin, remeron in the past which he either did not tolerate or were ineffective. He reports increased anxiety and depression since starting the lexapro including suicidal thoughts. He quit taking this 5 days ago. He is not sleeping well. Only sleeping about 2-3 hours per night.     Review of Systems       No Known Allergies   Past Medical History:   Diagnosis Date   • Anxiety    • Restless leg syndrome      Current Outpatient Medications   Medication Sig Dispense Refill   • albuterol sulfate  (90 Base) MCG/ACT inhaler Inhale 2 puffs Every 4 (Four) Hours As Needed for Shortness of Air. 18 g 2   • cetirizine (zyrTEC) 10 MG tablet Take 1 tablet by mouth Daily. 90 tablet 3   • fluticasone (Flonase) 50 MCG/ACT nasal spray 2 sprays into the nostril(s) as directed by provider Daily. 11.1 mL 0   • nicotine (NICODERM CQ) 21 MG/24HR patch Place 1 patch on the skin as directed by provider Daily. 30 patch 1   • pramipexole (MIRAPEX) 1 MG tablet Take 1 tablet nightly PRN RLS. 90 tablet 0   • rizatriptan (Maxalt) 10 MG tablet Take 1 tablet by mouth 1 (One) Time As Needed for Migraine. May repeat in 2 hours if needed 18 tablet 1   • doxepin (SINEquan) 25 MG capsule Take 1 capsule by mouth Every Night. 90 capsule 0   • ibuprofen (ADVIL,MOTRIN) 600 MG tablet Take 1 tablet by mouth See Admin Instructions. Take 1 tablet by mouth every 6-8 hours as needed       No current facility-administered medications for this visit.     History reviewed. No pertinent surgical history.   Social History  "    Tobacco Use   • Smoking status: Every Day     Packs/day: 1.00     Types: Cigarettes     Start date: 2000   • Smokeless tobacco: Never   Substance Use Topics   • Alcohol use: Yes     Comment: rarely, 1-2 per month   • Drug use: Never     Family History   Problem Relation Age of Onset   • Heart disease Paternal Grandmother      Health Maintenance Due   Topic Date Due   • ANNUAL PHYSICAL  Never done      Immunization History   Administered Date(s) Administered   • DTaP 05/16/2018        Objective     Vitals:    10/12/22 1548   BP: 111/74   BP Location: Left arm   Patient Position: Sitting   Pulse: 94   Temp: 99.5 °F (37.5 °C)   TempSrc: Oral   Weight: 47.6 kg (105 lb)   Height: 167.6 cm (65.98\")     Body mass index is 16.96 kg/m².     Physical Exam  Vitals reviewed.   Constitutional:       General: He is not in acute distress.     Appearance: Normal appearance. He is well-developed.   HENT:      Head: Normocephalic and atraumatic.   Cardiovascular:      Rate and Rhythm: Normal rate and regular rhythm.   Pulmonary:      Effort: Pulmonary effort is normal.      Breath sounds: Normal breath sounds.   Neurological:      Mental Status: He is alert and oriented to person, place, and time.   Psychiatric:         Mood and Affect: Mood and affect normal.           Result Review :                               Assessment and Plan      Diagnoses and all orders for this visit:    1. Anxiety (Primary)  Comments:  Will start doxepin. Let me know of any concerns.   Orders:  -     doxepin (SINEquan) 25 MG capsule; Take 1 capsule by mouth Every Night.  Dispense: 90 capsule; Refill: 0    2. Restless leg syndrome  Comments:  Continue pramipexole.               Follow Up     Return for Next scheduled follow up.             "

## 2022-10-19 DIAGNOSIS — G25.81 RESTLESS LEG SYNDROME: ICD-10-CM

## 2022-10-20 RX ORDER — PRAMIPEXOLE DIHYDROCHLORIDE 1 MG/1
TABLET ORAL
Qty: 90 TABLET | Refills: 0 | Status: SHIPPED | OUTPATIENT
Start: 2022-10-20

## 2022-11-18 ENCOUNTER — OFFICE VISIT (OUTPATIENT)
Dept: FAMILY MEDICINE CLINIC | Age: 37
End: 2022-11-18

## 2022-11-18 VITALS
SYSTOLIC BLOOD PRESSURE: 125 MMHG | HEIGHT: 66 IN | TEMPERATURE: 97.9 F | BODY MASS INDEX: 18 KG/M2 | WEIGHT: 112 LBS | HEART RATE: 89 BPM | DIASTOLIC BLOOD PRESSURE: 81 MMHG

## 2022-11-18 DIAGNOSIS — F41.9 ANXIETY: ICD-10-CM

## 2022-11-18 PROCEDURE — 99214 OFFICE O/P EST MOD 30 MIN: CPT | Performed by: NURSE PRACTITIONER

## 2022-11-18 RX ORDER — DOXEPIN HYDROCHLORIDE 25 MG/1
25 CAPSULE ORAL NIGHTLY
Qty: 90 CAPSULE | Refills: 0 | Status: SHIPPED | OUTPATIENT
Start: 2022-11-18

## 2022-11-18 RX ORDER — DULOXETIN HYDROCHLORIDE 30 MG/1
30 CAPSULE, DELAYED RELEASE ORAL DAILY
Qty: 90 CAPSULE | Refills: 0 | Status: SHIPPED | OUTPATIENT
Start: 2022-11-18

## 2022-11-18 NOTE — PROGRESS NOTES
Chief Complaint  Kenneth Wang presents to [facility] for Anxiety (3 month follow up )    Subjective          History of Present Illness    Kenneth is here today to follow up on anxiety. Current prescribed medication is doxepin 25 mg nightly. He was on citalopram prior but had reported some depression symptoms. Lexapro caused suicidal thoughts. He has been on prozac, wellbutrin, remeron in the past which he either did not tolerate or were ineffective. He notes improvement in sleep since starting the doxepin but still having anxiety and depression symptoms. Some fleeting suicidal thoughts but no attempts and can contract with me.     Review of Systems      No Known Allergies   Past Medical History:   Diagnosis Date   • Anxiety    • Restless leg syndrome      Current Outpatient Medications   Medication Sig Dispense Refill   • albuterol sulfate  (90 Base) MCG/ACT inhaler Inhale 2 puffs Every 4 (Four) Hours As Needed for Shortness of Air. 18 g 2   • cetirizine (zyrTEC) 10 MG tablet Take 1 tablet by mouth Daily. 90 tablet 3   • doxepin (SINEquan) 25 MG capsule Take 1 capsule by mouth Every Night. 90 capsule 0   • fluticasone (Flonase) 50 MCG/ACT nasal spray 2 sprays into the nostril(s) as directed by provider Daily. 11.1 mL 0   • ibuprofen (ADVIL,MOTRIN) 600 MG tablet Take 1 tablet by mouth See Admin Instructions. Take 1 tablet by mouth every 6-8 hours as needed     • nicotine (NICODERM CQ) 21 MG/24HR patch Place 1 patch on the skin as directed by provider Daily. 30 patch 1   • pramipexole (MIRAPEX) 1 MG tablet TAKE 1 TABLET BY MOUTH NIGHTLY AS NEEDED 90 tablet 0   • rizatriptan (Maxalt) 10 MG tablet Take 1 tablet by mouth 1 (One) Time As Needed for Migraine. May repeat in 2 hours if needed 18 tablet 1   • DULoxetine (CYMBALTA) 30 MG capsule Take 1 capsule by mouth Daily. 90 capsule 0     No current facility-administered medications for this visit.     History reviewed. No pertinent  "surgical history.   Social History     Tobacco Use   • Smoking status: Every Day     Packs/day: 1.00     Types: Cigarettes     Start date: 2000   • Smokeless tobacco: Never   Substance Use Topics   • Alcohol use: Yes     Comment: rarely, 1-2 per month   • Drug use: Never     Family History   Problem Relation Age of Onset   • Heart disease Paternal Grandmother      Health Maintenance Due   Topic Date Due   • ANNUAL PHYSICAL  Never done      Immunization History   Administered Date(s) Administered   • DTaP 05/16/2018        Objective     Vitals:    11/18/22 1538   BP: 125/81   BP Location: Left arm   Patient Position: Sitting   Pulse: 89   Temp: 97.9 °F (36.6 °C)   TempSrc: Oral   Weight: 50.8 kg (112 lb)   Height: 167.6 cm (65.98\")     Body mass index is 18.09 kg/m².     Physical Exam  Vitals reviewed.   Constitutional:       General: He is not in acute distress.     Appearance: Normal appearance. He is well-developed.   HENT:      Head: Normocephalic and atraumatic.   Cardiovascular:      Rate and Rhythm: Normal rate.   Pulmonary:      Effort: Pulmonary effort is normal.   Neurological:      Mental Status: He is alert and oriented to person, place, and time.   Psychiatric:         Mood and Affect: Mood and affect normal.           Result Review :                               Assessment and Plan      Diagnoses and all orders for this visit:    1. Anxiety  Comments:  Continue doxepin. Add duloxetine. Encourage counseling.   Orders:  -     DULoxetine (CYMBALTA) 30 MG capsule; Take 1 capsule by mouth Daily.  Dispense: 90 capsule; Refill: 0  -     doxepin (SINEquan) 25 MG capsule; Take 1 capsule by mouth Every Night.  Dispense: 90 capsule; Refill: 0               Follow Up     Return in about 8 weeks (around 1/13/2023) for Recheck.             "

## 2022-12-20 ENCOUNTER — OFFICE VISIT (OUTPATIENT)
Dept: FAMILY MEDICINE CLINIC | Age: 37
End: 2022-12-20

## 2022-12-20 VITALS
HEIGHT: 66 IN | BODY MASS INDEX: 16.84 KG/M2 | SYSTOLIC BLOOD PRESSURE: 129 MMHG | DIASTOLIC BLOOD PRESSURE: 99 MMHG | TEMPERATURE: 98.1 F | WEIGHT: 104.8 LBS | OXYGEN SATURATION: 100 % | HEART RATE: 133 BPM

## 2022-12-20 DIAGNOSIS — R05.1 ACUTE COUGH: ICD-10-CM

## 2022-12-20 DIAGNOSIS — R11.0 NAUSEA: Primary | ICD-10-CM

## 2022-12-20 PROCEDURE — 99213 OFFICE O/P EST LOW 20 MIN: CPT | Performed by: NURSE PRACTITIONER

## 2022-12-20 RX ORDER — DEXTROMETHORPHAN HYDROBROMIDE AND PROMETHAZINE HYDROCHLORIDE 15; 6.25 MG/5ML; MG/5ML
5 SYRUP ORAL NIGHTLY PRN
Qty: 118 ML | Refills: 0 | Status: SHIPPED | OUTPATIENT
Start: 2022-12-20

## 2022-12-20 RX ORDER — ONDANSETRON 4 MG/1
4 TABLET, ORALLY DISINTEGRATING ORAL EVERY 8 HOURS PRN
Qty: 15 TABLET | Refills: 0 | Status: SHIPPED | OUTPATIENT
Start: 2022-12-20

## 2022-12-20 NOTE — PROGRESS NOTES
"Chief Complaint  Vomiting (Since having flu and strep 2 weeks ago) and Cough    Subjective        Kenneth Wang presents to [facility]  Vomiting   This is a new problem. The current episode started 1 to 4 weeks ago. The problem occurs 2 to 4 times per day. The emesis has an appearance of stomach contents and bile. There has been no fever. Associated symptoms include chills, coughing and URI. Pertinent negatives include no chest pain or fever. He has tried acetaminophen for the symptoms. The treatment provided mild relief.   Cough  This is a new problem. The current episode started 1 to 4 weeks ago. The problem has been gradually worsening. The cough is productive of sputum. Associated symptoms include chills, nasal congestion and postnasal drip. Pertinent negatives include no chest pain, fever or sore throat.   Denies fever, chest pain or shortness of breath at this time.    Objective   Vital Signs:  /99 (BP Location: Left arm, Patient Position: Sitting)   Pulse (!) 133   Temp 98.1 °F (36.7 °C)   Ht 167.6 cm (66\")   Wt 47.5 kg (104 lb 12.8 oz)   SpO2 100% Comment: on room air  BMI 16.92 kg/m²   Estimated body mass index is 16.92 kg/m² as calculated from the following:    Height as of this encounter: 167.6 cm (66\").    Weight as of this encounter: 47.5 kg (104 lb 12.8 oz).          Physical Exam  HENT:      Head: Normocephalic.      Mouth/Throat:      Pharynx: Posterior oropharyngeal erythema present. No oropharyngeal exudate.   Cardiovascular:      Rate and Rhythm: Regular rhythm. Tachycardia present.   Pulmonary:      Effort: Pulmonary effort is normal. No respiratory distress.      Breath sounds: Normal breath sounds. No stridor. No wheezing, rhonchi or rales.   Skin:     General: Skin is warm and dry.   Neurological:      Mental Status: He is alert and oriented to person, place, and time.   Psychiatric:         Mood and Affect: Mood normal.        Result Review " :                Assessment and Plan   Diagnoses and all orders for this visit:    1. Nausea (Primary)  Comments:  Will treat cough and nausea, force fluids, if unable to hold liquids down, go to ER for IV fluids.   Orders:  -     ondansetron ODT (ZOFRAN-ODT) 4 MG disintegrating tablet; Place 1 tablet on the tongue Every 8 (Eight) Hours As Needed for Nausea or Vomiting.  Dispense: 15 tablet; Refill: 0    2. Acute cough  -     promethazine-dextromethorphan (PROMETHAZINE-DM) 6.25-15 MG/5ML syrup; Take 5 mL by mouth At Night As Needed for Cough.  Dispense: 118 mL; Refill: 0             Follow Up   Return if symptoms worsen or fail to improve.  Patient was given instructions and counseling regarding his condition or for health maintenance advice. Please see specific information pulled into the AVS if appropriate.

## 2023-01-09 ENCOUNTER — OFFICE VISIT (OUTPATIENT)
Dept: FAMILY MEDICINE CLINIC | Age: 38
End: 2023-01-09
Payer: COMMERCIAL

## 2023-01-09 VITALS
SYSTOLIC BLOOD PRESSURE: 137 MMHG | OXYGEN SATURATION: 98 % | WEIGHT: 110.2 LBS | TEMPERATURE: 98.6 F | HEIGHT: 66 IN | BODY MASS INDEX: 17.71 KG/M2 | DIASTOLIC BLOOD PRESSURE: 97 MMHG | HEART RATE: 112 BPM

## 2023-01-09 DIAGNOSIS — G43.009 MIGRAINE WITHOUT AURA AND WITHOUT STATUS MIGRAINOSUS, NOT INTRACTABLE: ICD-10-CM

## 2023-01-09 DIAGNOSIS — B34.9 VIRAL INFECTION: Primary | ICD-10-CM

## 2023-01-09 DIAGNOSIS — F41.9 ANXIETY: ICD-10-CM

## 2023-01-09 LAB
EXPIRATION DATE: NORMAL
FLUAV AG UPPER RESP QL IA.RAPID: NOT DETECTED
FLUBV AG UPPER RESP QL IA.RAPID: NOT DETECTED
INTERNAL CONTROL: NORMAL
Lab: NORMAL
SARS-COV-2 AG UPPER RESP QL IA.RAPID: NOT DETECTED

## 2023-01-09 PROCEDURE — 87428 SARSCOV & INF VIR A&B AG IA: CPT | Performed by: NURSE PRACTITIONER

## 2023-01-09 PROCEDURE — 99214 OFFICE O/P EST MOD 30 MIN: CPT | Performed by: NURSE PRACTITIONER

## 2023-01-09 RX ORDER — SUMATRIPTAN 50 MG/1
TABLET, FILM COATED ORAL
Qty: 18 TABLET | Refills: 1 | Status: SHIPPED | OUTPATIENT
Start: 2023-01-09

## 2023-01-09 RX ORDER — RIZATRIPTAN BENZOATE 10 MG/1
10 TABLET ORAL ONCE AS NEEDED
Qty: 18 TABLET | Refills: 1 | Status: CANCELLED | OUTPATIENT
Start: 2023-01-09

## 2023-01-09 RX ORDER — PROPRANOLOL HYDROCHLORIDE 10 MG/1
10 TABLET ORAL EVERY 12 HOURS PRN
Qty: 90 TABLET | Refills: 0 | Status: SHIPPED | OUTPATIENT
Start: 2023-01-09

## 2023-01-09 NOTE — PROGRESS NOTES
Chief Complaint  Kenneth Wang presents to [facility] for Fever (X 3 days), Cough (X 3 days), Vomiting (X 3 days), and Diarrhea (X 3 days)    Subjective          History of Present Illness    Kenneth is here today with c/o 3 day history of fever, cough, vomiting, diarrhea. He reports fever of 101 that has now resolved. He reports that he has been experiencing more frequent headaches recently. Maxalt has not been helping. He has been taking Excedrin. He has been drinking alcohol daily after work. Typically 4-6 beers per night. Previously was only drinking once per week. He reports previously drinking liquor frequently.  He is worried about his drinking. He does note that stomach issues seem worse with anxiety even though these symptoms have only been for a few days.       Review of Systems      No Known Allergies   Past Medical History:   Diagnosis Date   • Anxiety    • Restless leg syndrome      Current Outpatient Medications   Medication Sig Dispense Refill   • albuterol sulfate  (90 Base) MCG/ACT inhaler Inhale 2 puffs Every 4 (Four) Hours As Needed for Shortness of Air. 18 g 2   • cetirizine (zyrTEC) 10 MG tablet Take 1 tablet by mouth Daily. 90 tablet 3   • doxepin (SINEquan) 25 MG capsule Take 1 capsule by mouth Every Night. 90 capsule 0   • DULoxetine (CYMBALTA) 30 MG capsule Take 1 capsule by mouth Daily. 90 capsule 0   • fluticasone (Flonase) 50 MCG/ACT nasal spray 2 sprays into the nostril(s) as directed by provider Daily. 11.1 mL 0   • ibuprofen (ADVIL,MOTRIN) 600 MG tablet Take 1 tablet by mouth See Admin Instructions. Take 1 tablet by mouth every 6-8 hours as needed     • ondansetron ODT (ZOFRAN-ODT) 4 MG disintegrating tablet Place 1 tablet on the tongue Every 8 (Eight) Hours As Needed for Nausea or Vomiting. 15 tablet 0   • pramipexole (MIRAPEX) 1 MG tablet TAKE 1 TABLET BY MOUTH NIGHTLY AS NEEDED 90 tablet 0   • promethazine-dextromethorphan (PROMETHAZINE-DM)  "6.25-15 MG/5ML syrup Take 5 mL by mouth At Night As Needed for Cough. 118 mL 0   • propranolol (INDERAL) 10 MG tablet Take 1 tablet by mouth Every 12 (Twelve) Hours As Needed (anxiety). 90 tablet 0   • SUMAtriptan (Imitrex) 50 MG tablet Take one tablet at onset of headache. May repeat dose one time in 2 hours if headache not relieved. 18 tablet 1     No current facility-administered medications for this visit.     History reviewed. No pertinent surgical history.   Social History     Tobacco Use   • Smoking status: Every Day     Packs/day: 1.00     Types: Cigarettes     Start date: 2000   • Smokeless tobacco: Never   Substance Use Topics   • Alcohol use: Yes     Comment: rarely, 1-2 per month   • Drug use: Never     Family History   Problem Relation Age of Onset   • Heart disease Paternal Grandmother      Health Maintenance Due   Topic Date Due   • COVID-19 Vaccine (1) Never done   • ANNUAL PHYSICAL  Never done      Immunization History   Administered Date(s) Administered   • DTaP 05/16/2018        Objective     Vitals:    01/09/23 1655   BP: 137/97   BP Location: Left arm   Patient Position: Sitting   Cuff Size: Adult   Pulse: 112   Temp: 98.6 °F (37 °C)   TempSrc: Oral   SpO2: 98%   Weight: 50 kg (110 lb 3.2 oz)   Height: 167.6 cm (65.98\")     Body mass index is 17.8 kg/m².     Physical Exam  Vitals reviewed.   Constitutional:       General: He is not in acute distress.     Appearance: Normal appearance. He is well-developed.   HENT:      Head: Normocephalic and atraumatic.      Right Ear: Tympanic membrane and ear canal normal.      Left Ear: Tympanic membrane and ear canal normal.      Mouth/Throat:      Mouth: Mucous membranes are moist.   Eyes:      Extraocular Movements: Extraocular movements intact.      Pupils: Pupils are equal, round, and reactive to light.   Cardiovascular:      Rate and Rhythm: Regular rhythm. Tachycardia present.   Pulmonary:      Effort: Pulmonary effort is normal.      Breath sounds: " Normal breath sounds.   Abdominal:      General: Bowel sounds are normal.      Palpations: Abdomen is soft.   Neurological:      Mental Status: He is alert and oriented to person, place, and time.   Psychiatric:         Mood and Affect: Mood and affect normal.           Result Review :     The following data was reviewed by: MICHAEL Matta on 01/09/2023:          POCT SARS-CoV-2 Antigen KULWINDER + Flu (01/09/2023 17:12)                  Assessment and Plan      Diagnoses and all orders for this visit:    1. Viral infection (Primary)  Comments:  Rapid covid/flu negative. Symptomatic tx discussed. Advised increased fluid intake. May take zofran that he has. F/U for persistent/worsening symptoms  Orders:  -     POCT SARS-CoV-2 Antigen KULWINDER + Flu    2. Migraine without aura and without status migrainosus, not intractable  Comments:  Stop maxalt. Trial sumatriptan.   Orders:  -     SUMAtriptan (Imitrex) 50 MG tablet; Take one tablet at onset of headache. May repeat dose one time in 2 hours if headache not relieved.  Dispense: 18 tablet; Refill: 1    3. Anxiety  Comments:  Will add propranolol for anxiety as he decreased alcohol intake.  Orders:  -     propranolol (INDERAL) 10 MG tablet; Take 1 tablet by mouth Every 12 (Twelve) Hours As Needed (anxiety).  Dispense: 90 tablet; Refill: 0              Follow Up     Return for Next scheduled follow up.

## 2023-01-09 NOTE — LETTER
January 9, 2023     Patient: Kenneth Wang   YOB: 1985   Date of Visit: 1/9/2023       To Whom It May Concern:    It is my medical opinion that Kenneth Wang be excused from work on Saturday 1/7/23, Sunday 1/8/23, and Monday 1/9/23..           Sincerely,        MICHAEL Matta    CC: No Recipients

## 2023-02-01 ENCOUNTER — TELEPHONE (OUTPATIENT)
Dept: FAMILY MEDICINE CLINIC | Age: 38
End: 2023-02-01

## 2023-05-04 ENCOUNTER — TELEPHONE (OUTPATIENT)
Dept: FAMILY MEDICINE CLINIC | Age: 38
End: 2023-05-04

## 2023-05-04 NOTE — TELEPHONE ENCOUNTER
Patient was a no show for the 2nd time on 05/03/2023 I called and left a vm for him to call back to reschedule. I mailed a letter to him as well.